# Patient Record
Sex: MALE | Race: WHITE | NOT HISPANIC OR LATINO | Employment: STUDENT | URBAN - METROPOLITAN AREA
[De-identification: names, ages, dates, MRNs, and addresses within clinical notes are randomized per-mention and may not be internally consistent; named-entity substitution may affect disease eponyms.]

---

## 2023-01-20 ENCOUNTER — OFFICE VISIT (OUTPATIENT)
Dept: FAMILY MEDICINE | Facility: CLINIC | Age: 23
End: 2023-01-20
Payer: COMMERCIAL

## 2023-01-20 VITALS
TEMPERATURE: 97.7 F | SYSTOLIC BLOOD PRESSURE: 112 MMHG | RESPIRATION RATE: 16 BRPM | HEIGHT: 71 IN | HEART RATE: 72 BPM | DIASTOLIC BLOOD PRESSURE: 80 MMHG | BODY MASS INDEX: 20.16 KG/M2 | OXYGEN SATURATION: 96 % | WEIGHT: 144 LBS

## 2023-01-20 DIAGNOSIS — J06.9 VIRAL URI: ICD-10-CM

## 2023-01-20 DIAGNOSIS — R59.0 LYMPHADENOPATHY OF RIGHT CERVICAL REGION: Primary | ICD-10-CM

## 2023-01-20 LAB — MONOCYTES NFR BLD AUTO: NEGATIVE %

## 2023-01-20 PROCEDURE — 99203 OFFICE O/P NEW LOW 30 MIN: CPT | Performed by: PHYSICIAN ASSISTANT

## 2023-01-20 PROCEDURE — 36415 COLL VENOUS BLD VENIPUNCTURE: CPT | Performed by: PHYSICIAN ASSISTANT

## 2023-01-20 PROCEDURE — 86308 HETEROPHILE ANTIBODY SCREEN: CPT | Mod: QW | Performed by: PHYSICIAN ASSISTANT

## 2023-01-20 ASSESSMENT — ENCOUNTER SYMPTOMS
HEADACHES: 1
GASTROINTESTINAL NEGATIVE: 1
FATIGUE: 1
FEVER: 0
RESPIRATORY NEGATIVE: 1
CARDIOVASCULAR NEGATIVE: 1
CHILLS: 0

## 2023-01-20 NOTE — PROGRESS NOTES
"  1. Lymphadenopathy of right cervical region  Monospot is negative, probable reactive lymph node related to viral URI  - Mononucleosis screen; Future  - Mononucleosis screen    2. Viral URI  Continue ibuprofen bid, consider adding tylenol in between times  Push fluids, rest  Follow up if worsening or new sxs develop      Subjective   Jeremías is a 22 year old accompanied by his self, presenting for the following health issues:  Headache (New pt here for headaches,fatigue and non painful bump on right side of neck x 3-4 days)      Headache   Pertinent negatives include no fever.   History of Present Illness       Headaches:   Since the patient's last clinic visit, headaches are: worsened  The patient is getting headaches:  Few times a day  He is not able to do normal daily activities when he has a migraine.  The patient is taking the following rescue/relief medications:  Ibuprofen (Advil, Motrin)   Patient states \"I get some relief\" from the rescue/relief medications.   The patient is taking the following medications to prevent migraines:  No medications to prevent migraines  In the past 4 weeks, the patient has gone to an Urgent Care or Emergency Room 0 times times due to headaches.    He eats 2-3 servings of fruits and vegetables daily.He consumes 1 sweetened beverage(s) daily.He exercises with enough effort to increase his heart rate 20 to 29 minutes per day.  He exercises with enough effort to increase his heart rate 4 days per week.   He is taking medications regularly.             4 day hx of ongoing headaches, taking ibuprofen for this (600 mg bid) and that helps, pain in back and top of head    Also nonpainful bump on right side of neck    No hx of migraines    Review of Systems   Constitutional: Positive for fatigue. Negative for chills and fever.   HENT: Negative.    Respiratory: Negative.    Cardiovascular: Negative.    Gastrointestinal: Negative.    Neurological: Positive for headaches.          " "  Objective    /80 (BP Location: Right arm, Patient Position: Sitting, Cuff Size: Adult Regular)   Pulse 72   Temp 97.7  F (36.5  C) (Tympanic)   Resp 16   Ht 1.803 m (5' 11\")   Wt 65.3 kg (144 lb)   SpO2 96%   BMI 20.08 kg/m    Body mass index is 20.08 kg/m .  Physical Exam  Vitals reviewed.   Constitutional:       Appearance: Normal appearance.   HENT:      Head:      Comments: No sinus tenderness     Right Ear: Tympanic membrane normal.      Left Ear: Tympanic membrane normal.      Mouth/Throat:      Mouth: Mucous membranes are moist.      Pharynx: Oropharynx is clear. No posterior oropharyngeal erythema.   Cardiovascular:      Rate and Rhythm: Normal rate and regular rhythm.      Pulses: Normal pulses.      Heart sounds: Normal heart sounds.   Pulmonary:      Effort: Pulmonary effort is normal.      Breath sounds: Normal breath sounds.   Abdominal:      General: Abdomen is flat. Bowel sounds are normal.      Palpations: Abdomen is soft.   Musculoskeletal:      Cervical back: Normal range of motion.   Lymphadenopathy:      Cervical: Cervical adenopathy (right posterior cervical non tender enlarged lymph node) present.   Neurological:      Mental Status: He is alert.            Results for orders placed or performed in visit on 01/20/23 (from the past 24 hour(s))   Mononucleosis screen   Result Value Ref Range    Mononucleosis Screen Negative Negative                   "

## 2024-08-16 ENCOUNTER — OFFICE VISIT (OUTPATIENT)
Dept: FAMILY MEDICINE | Facility: CLINIC | Age: 24
End: 2024-08-16
Payer: COMMERCIAL

## 2024-08-16 VITALS
WEIGHT: 140.3 LBS | RESPIRATION RATE: 16 BRPM | TEMPERATURE: 97.6 F | BODY MASS INDEX: 19.64 KG/M2 | DIASTOLIC BLOOD PRESSURE: 86 MMHG | OXYGEN SATURATION: 99 % | HEIGHT: 71 IN | SYSTOLIC BLOOD PRESSURE: 130 MMHG | HEART RATE: 74 BPM

## 2024-08-16 DIAGNOSIS — R11.2 NAUSEA AND VOMITING, UNSPECIFIED VOMITING TYPE: Primary | ICD-10-CM

## 2024-08-16 PROCEDURE — 99213 OFFICE O/P EST LOW 20 MIN: CPT

## 2024-08-16 RX ORDER — ONDANSETRON 4 MG/1
4 TABLET, ORALLY DISINTEGRATING ORAL EVERY 8 HOURS PRN
Qty: 20 TABLET | Refills: 0 | Status: SHIPPED | OUTPATIENT
Start: 2024-08-16

## 2024-08-16 ASSESSMENT — PATIENT HEALTH QUESTIONNAIRE - PHQ9
SUM OF ALL RESPONSES TO PHQ QUESTIONS 1-9: 9
SUM OF ALL RESPONSES TO PHQ QUESTIONS 1-9: 9
10. IF YOU CHECKED OFF ANY PROBLEMS, HOW DIFFICULT HAVE THESE PROBLEMS MADE IT FOR YOU TO DO YOUR WORK, TAKE CARE OF THINGS AT HOME, OR GET ALONG WITH OTHER PEOPLE: NOT DIFFICULT AT ALL

## 2024-08-16 NOTE — LETTER
August 16, 2024      Jeremías Mckay  [address] [facility] [id number]        To Whom It May Concern:    Jeremías Mckay  was seen on 8/16/24.  Please excuse him  until fever free with no vomiting for 24 hours and due to illness.        Sincerely,        GI Archuleta CNP

## 2024-08-16 NOTE — PROGRESS NOTES
"  Assessment & Plan     Nausea and vomiting, unspecified vomiting type  Nausea vomiting for 4 days.  No diarrhea.  No known exposure, no ill contacts.  Patient has mild epigastric tenderness on exam.  No fevers.  Suspect viral gastroenteritis, discussed supportive treatments including fluids and bland foods with slowly advanced diet as tolerated.  Discussed signs of dehydration.  Discussed that if patient is not improving may do stool sample to see if there is bacterial infection.  - ondansetron (ZOFRAN ODT) 4 MG ODT tab; Take 1 tablet (4 mg) by mouth every 8 hours as needed for nausea or vomiting  - Enteric Bacteria and Virus Panel by [facility] Stool; Future  - Enteric Bacteria and Virus Panel by [facility] Stool          Subjective   Jeremías is a 24 year old, presenting for the following health issues:  Abdominal Pain (Abdominal pain x 4 days )        8/16/2024     3:28 PM   Additional Questions   Roomed by NAVEEN Madera     History of Present Illness       Headaches:   Since the patient's last clinic visit, headaches are: worsened  The patient is getting headaches:  A lot  He is not able to do normal daily activities when he has a migraine.  The patient is taking the following rescue/relief medications:  Ibuprofen (Advil, Motrin)   Patient states \"I get some relief\" from the rescue/relief medications.   The patient is taking the following medications to prevent migraines:  No medications to prevent migraines  In the past 4 weeks, the patient has gone to an Urgent Care or Emergency Room 0 times times due to headaches.    He eats 0-1 servings of fruits and vegetables daily.He consumes 1 sweetened beverage(s) daily.He exercises with enough effort to increase his heart rate 9 or less minutes per day.  He exercises with enough effort to increase his heart rate 3 or less days per week.   He is taking medications regularly.                     Objective    /86 (BP Location: Right arm, Patient Position: Sitting, Cuff Size: Adult " "Regular)   Pulse 74   Temp 97.6  F (36.4  C) (Oral)   Resp 16   Ht 1.803 m (5' 11\")   Wt 63.6 kg (140 lb 4.8 oz)   SpO2 99%   BMI 19.57 kg/m    Body mass index is 19.57 kg/m .  Physical Exam  HENT:      Head: Normocephalic.   Cardiovascular:      Rate and Rhythm: Normal rate.   Pulmonary:      Effort: Pulmonary effort is normal.      Breath sounds: Normal breath sounds.   Abdominal:      General: Abdomen is flat. Bowel sounds are normal.      Palpations: Abdomen is soft.      Tenderness: There is abdominal tenderness.      Comments: Epigastric tenderness   Skin:     General: Skin is warm.      Capillary Refill: Capillary refill takes less than 2 seconds.   Neurological:      Mental Status: He is alert and oriented to person, place, and time.                    Signed Electronically by: GI Archuleta CNP    "